# Patient Record
Sex: FEMALE | Race: WHITE | Employment: PART TIME | URBAN - NONMETROPOLITAN AREA
[De-identification: names, ages, dates, MRNs, and addresses within clinical notes are randomized per-mention and may not be internally consistent; named-entity substitution may affect disease eponyms.]

---

## 2018-10-11 ENCOUNTER — OFFICE VISIT (OUTPATIENT)
Dept: ORTHOPEDIC SURGERY | Age: 58
End: 2018-10-11
Payer: COMMERCIAL

## 2018-10-11 VITALS
SYSTOLIC BLOOD PRESSURE: 135 MMHG | HEART RATE: 70 BPM | WEIGHT: 216 LBS | DIASTOLIC BLOOD PRESSURE: 92 MMHG | HEIGHT: 59 IN | BODY MASS INDEX: 43.55 KG/M2

## 2018-10-11 DIAGNOSIS — M25.531 RIGHT WRIST PAIN: Primary | ICD-10-CM

## 2018-10-11 DIAGNOSIS — M65.4 DE QUERVAIN'S TENOSYNOVITIS, RIGHT: ICD-10-CM

## 2018-10-11 PROCEDURE — 99203 OFFICE O/P NEW LOW 30 MIN: CPT | Performed by: ORTHOPAEDIC SURGERY

## 2018-10-11 PROCEDURE — L3908 WHO COCK-UP NONMOLDE PRE OTS: HCPCS | Performed by: ORTHOPAEDIC SURGERY

## 2018-10-11 RX ORDER — ROSUVASTATIN CALCIUM 5 MG/1
5 TABLET, COATED ORAL DAILY
COMMUNITY
End: 2018-10-15

## 2018-10-11 RX ORDER — OMEPRAZOLE 20 MG/1
20 CAPSULE, DELAYED RELEASE ORAL DAILY
COMMUNITY
End: 2018-10-15

## 2018-10-11 RX ORDER — DICLOFENAC SODIUM 75 MG/1
75 TABLET, DELAYED RELEASE ORAL 2 TIMES DAILY
COMMUNITY
End: 2018-10-15

## 2018-10-11 NOTE — PROGRESS NOTES
Injection administration details:  Date & Time: 10/11/18 9:12 AM  Site & Comments: right wrist administered by Dr Justino Coronado    Kenalog-40 400mg/10mL  CC# : 0.8   [facility]:[id number]   Lot number: [id number]  EXP: 08/19    Lidocaine 1% 50mL  CC# : 1  [facility]: 2047-1190-50  Lot number: [id number]  EXP: 11/18

## 2018-11-08 ENCOUNTER — OFFICE VISIT (OUTPATIENT)
Dept: ORTHOPEDIC SURGERY | Age: 58
End: 2018-11-08
Payer: COMMERCIAL

## 2018-11-08 DIAGNOSIS — M65.4 DE QUERVAIN'S TENOSYNOVITIS, RIGHT: Primary | ICD-10-CM

## 2018-11-08 PROCEDURE — 99212 OFFICE O/P EST SF 10 MIN: CPT | Performed by: ORTHOPAEDIC SURGERY

## 2018-11-08 RX ORDER — ROSUVASTATIN CALCIUM 5 MG/1
5 TABLET, COATED ORAL DAILY
COMMUNITY

## 2018-11-08 RX ORDER — OLMESARTAN MEDOXOMIL AND HYDROCHLOROTHIAZIDE 20/12.5 20; 12.5 MG/1; MG/1
TABLET ORAL
Refills: 3 | COMMUNITY
Start: 2018-10-16

## 2018-11-08 RX ORDER — OMEPRAZOLE 20 MG/1
CAPSULE, DELAYED RELEASE ORAL
Refills: 3 | COMMUNITY
Start: 2018-10-16

## 2018-11-08 NOTE — PROGRESS NOTES
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 I do think that some of her residual aching may be secondary to her degenerative STT arthritis but overall she is much more functional since her last visit. She may continue to wean out of her brace, continue to monitor her symptoms. If she does have return of symptoms or persistent symptoms I would recommend re-implementing use of the brace and activity modification. After that, we would consider de Quervain's release versus repeat corticosteroid injection. For now, the patient will plan to follow up on an as-needed basis and call with any further questions or concerns    No Follow-up on file. History of Present Illness  Joselito Motley is a 59 y.o. female here today for a follow-up for Her right radial sided wrist pain consistent with likely de Quervain's tenosynovitis. She also has radiographic evidence of likely STT joint space narrowing and degenerative changes  At her last visit we primarily initiate treatment for de Quervain's tenosynovitis including corticosteroid injection as well as bracing and activity modification. Since her last visit nearly 1 month ago the patient has been doing very well. She reports approximately 90% improvement in her symptoms, only some occasional aching in her right wrist with activity. She has been weaning out of the brace slowly after wearing more consistently for the 1st several weeks  No additional complaints today      Review of Systems  Pertinent items are noted in HPI  Review of systems reviewed from Patient History Form dated on 10/11/18 and available in the patient's chart under the Media tab.        Vital Signs  There were no vitals filed for this